# Patient Record
Sex: FEMALE | Race: WHITE | NOT HISPANIC OR LATINO | Employment: FULL TIME | URBAN - METROPOLITAN AREA
[De-identification: names, ages, dates, MRNs, and addresses within clinical notes are randomized per-mention and may not be internally consistent; named-entity substitution may affect disease eponyms.]

---

## 2017-05-30 ENCOUNTER — AMBULATORY - RIVER FALLS (OUTPATIENT)
Dept: FAMILY MEDICINE | Facility: CLINIC | Age: 27
End: 2017-05-30

## 2023-02-06 ENCOUNTER — HOSPITAL ENCOUNTER (EMERGENCY)
Facility: CLINIC | Age: 33
Discharge: HOME | End: 2023-02-07
Attending: EMERGENCY MEDICINE | Admitting: EMERGENCY MEDICINE
Payer: MEDICAID

## 2023-02-06 ENCOUNTER — APPOINTMENT (OUTPATIENT)
Dept: ULTRASOUND IMAGING | Facility: CLINIC | Age: 33
End: 2023-02-06
Attending: EMERGENCY MEDICINE
Payer: MEDICAID

## 2023-02-06 DIAGNOSIS — N93.9 VAGINAL BLEEDING: ICD-10-CM

## 2023-02-06 DIAGNOSIS — D62 ANEMIA DUE TO BLOOD LOSS, ACUTE: ICD-10-CM

## 2023-02-06 DIAGNOSIS — Z32.01 PREGNANCY TEST POSITIVE: ICD-10-CM

## 2023-02-06 LAB
ABO/RH(D): NORMAL
ANTIBODY SCREEN: NEGATIVE
BASOPHILS # BLD AUTO: 0.1 10E3/UL (ref 0–0.2)
BASOPHILS NFR BLD AUTO: 1 %
EOSINOPHIL # BLD AUTO: 0.1 10E3/UL (ref 0–0.7)
EOSINOPHIL NFR BLD AUTO: 1 %
ERYTHROCYTE [DISTWIDTH] IN BLOOD BY AUTOMATED COUNT: 13 % (ref 10–15)
HCG INTACT+B SERPL-ACNC: 21 MIU/ML
HCT VFR BLD AUTO: 27.2 % (ref 35–47)
HGB BLD-MCNC: 9 G/DL (ref 11.7–15.7)
HOLD SPECIMEN: NORMAL
HOLD SPECIMEN: NORMAL
IMM GRANULOCYTES # BLD: 0.1 10E3/UL
IMM GRANULOCYTES NFR BLD: 1 %
LYMPHOCYTES # BLD AUTO: 1.8 10E3/UL (ref 0.8–5.3)
LYMPHOCYTES NFR BLD AUTO: 20 %
MCH RBC QN AUTO: 29.9 PG (ref 26.5–33)
MCHC RBC AUTO-ENTMCNC: 33.1 G/DL (ref 31.5–36.5)
MCV RBC AUTO: 90 FL (ref 78–100)
MONOCYTES # BLD AUTO: 0.7 10E3/UL (ref 0–1.3)
MONOCYTES NFR BLD AUTO: 7 %
NEUTROPHILS # BLD AUTO: 6.3 10E3/UL (ref 1.6–8.3)
NEUTROPHILS NFR BLD AUTO: 70 %
NRBC # BLD AUTO: 0 10E3/UL
NRBC BLD AUTO-RTO: 0 /100
PLATELET # BLD AUTO: 433 10E3/UL (ref 150–450)
RBC # BLD AUTO: 3.01 10E6/UL (ref 3.8–5.2)
SPECIMEN EXPIRATION DATE: NORMAL
WBC # BLD AUTO: 9 10E3/UL (ref 4–11)

## 2023-02-06 PROCEDURE — 86901 BLOOD TYPING SEROLOGIC RH(D): CPT | Performed by: EMERGENCY MEDICINE

## 2023-02-06 PROCEDURE — 36415 COLL VENOUS BLD VENIPUNCTURE: CPT | Performed by: EMERGENCY MEDICINE

## 2023-02-06 PROCEDURE — 85025 COMPLETE CBC W/AUTO DIFF WBC: CPT | Performed by: EMERGENCY MEDICINE

## 2023-02-06 PROCEDURE — 86850 RBC ANTIBODY SCREEN: CPT | Performed by: EMERGENCY MEDICINE

## 2023-02-06 PROCEDURE — 99285 EMERGENCY DEPT VISIT HI MDM: CPT | Mod: 25

## 2023-02-06 PROCEDURE — 76801 OB US < 14 WKS SINGLE FETUS: CPT

## 2023-02-06 PROCEDURE — 84702 CHORIONIC GONADOTROPIN TEST: CPT | Performed by: EMERGENCY MEDICINE

## 2023-02-06 RX ORDER — PRENATAL VIT/IRON FUM/FOLIC AC 27MG-0.8MG
1 TABLET ORAL DAILY
Qty: 90 TABLET | Refills: 3 | Status: SHIPPED | OUTPATIENT
Start: 2023-02-06

## 2023-02-06 ASSESSMENT — ENCOUNTER SYMPTOMS
COUGH: 0
RHINORRHEA: 0
VOMITING: 0
LIGHT-HEADEDNESS: 1
SORE THROAT: 0
MYALGIAS: 1
FREQUENCY: 0
DYSURIA: 0
DIARRHEA: 0
DIFFICULTY URINATING: 0
FEVER: 1
HEMATURIA: 0

## 2023-02-06 ASSESSMENT — ACTIVITIES OF DAILY LIVING (ADL)
ADLS_ACUITY_SCORE: 35
ADLS_ACUITY_SCORE: 35

## 2023-02-06 NOTE — Clinical Note
Flor Urias was seen and treated in our emergency department on 2/6/2023.  She may return to work on 02/08/2023.       If you have any questions or concerns, please don't hesitate to call.      Devante Romano MD

## 2023-02-07 VITALS
RESPIRATION RATE: 18 BRPM | HEART RATE: 91 BPM | SYSTOLIC BLOOD PRESSURE: 113 MMHG | WEIGHT: 120 LBS | BODY MASS INDEX: 23.44 KG/M2 | OXYGEN SATURATION: 100 % | TEMPERATURE: 98.2 F | DIASTOLIC BLOOD PRESSURE: 78 MMHG

## 2023-02-07 NOTE — ED TRIAGE NOTES
[facility] bleeding with large clots and cramping x3 weeks. Hgb today was 9.4. Faintly positive UPT at clinic. Pt feeling lightheaded and dizzy x1 week. Pt states she was going through tampons every 10 minutes intermittently, today not bleeding very much. ABCs intact.

## 2023-02-07 NOTE — ED PROVIDER NOTES
History     Chief Complaint:  Vaginal Bleeding       The history is provided by the patient.      Flor Urias is an otherwise healthy 32 year old female, , who presents with heavy vaginal bleeding since 3 weeks ago. At worst, she would usually go through a tampon every 10 minutes. She has noticed some clots with the bleeding. While passing the clots, she would have some lightheadedness and cramping. Another symptom mentioned was a fever for 1 day last week, and it was as high as 105 degrees, which has since resolved. Her bleeding has lightened almost to a stop over the past couple of days, and she has not noticed anymore clots. [facility] where she was noted to have a positive hCG urine and a hemoglobin of 9.6. She was then advised to go to the ED. She previously had her Nexplanon taken out in 2022, and has had only a couple light periods since then. Her last one was 3 months ago. She does not have a history of clotting or bleeding disorder. She denies cough, rhinorrhea, sore throat, vomiting, diarrhea, or urinary symptoms.     Independent Historian: None     Review of External Notes: None      ROS:  Review of Systems   Constitutional: Positive for fever.   HENT: Negative for rhinorrhea and sore throat.    Respiratory: Negative for cough.    Gastrointestinal: Negative for diarrhea and vomiting.   Genitourinary: Positive for vaginal bleeding (w/ clots). Negative for decreased urine volume, difficulty urinating, dysuria, frequency, hematuria and urgency.   Musculoskeletal: Positive for myalgias.   Neurological: Positive for light-headedness.   All other systems reviewed and are negative.    Allergies:  No Known Allergies     Medications:    The patient denies current use of medications.     Past Medical History:    The patient denies pertinent past medical history.    Past Surgical History:    [place] teeth extraction     Social History:  Hx of smoking   PCP:  No Ref-Primary, Physician   The patient presents to the ED alone via private vehicle     Physical Exam     Patient Vitals for the past 24 hrs:   BP Temp Temp src Pulse Resp SpO2 Weight   02/06/23 1841 113/78 98.2  F (36.8  C) Temporal 91 18 100 % 54.4 kg (120 lb)        Physical Exam  Vitals and nursing note reviewed.   Constitutional:       General: She is not in acute distress.     Appearance: She is not ill-appearing.   HENT:      Head: Normocephalic and atraumatic.      Right Ear: External ear normal.      Left Ear: External ear normal.   Eyes:      Extraocular Movements: Extraocular movements intact.      Conjunctiva/sclera: Conjunctivae normal.   Cardiovascular:      Rate and Rhythm: Normal rate and regular rhythm.      Heart sounds: No murmur heard.  Pulmonary:      Effort: Pulmonary effort is normal. No respiratory distress.      Breath sounds: Normal breath sounds. No wheezing, rhonchi or rales.   Abdominal:      General: Abdomen is flat. Bowel sounds are normal. There is no distension.      Palpations: Abdomen is soft.      Tenderness: There is no abdominal tenderness. There is no guarding or rebound.   Musculoskeletal:         General: No deformity or signs of injury.      Cervical back: Normal range of motion and neck supple.   Skin:     General: Skin is warm and dry.      Coloration: Skin is not pale.      Findings: No rash.   Neurological:      Mental Status: She is alert and oriented to person, place, and time.   Psychiatric:         Behavior: Behavior normal.       Emergency Department Course   Imaging:  OB  US 1st trimester w transvag  Final Result  IMPRESSION:   There is no evidence of pregnancy. Ectopic pregnancy is not ruled out.    Report per radiology    Laboratory:  Labs Ordered and Resulted from Time of ED Arrival to Time of ED Departure   HCG QUANTITATIVE PREGNANCY - Abnormal       Result Value    hCG Quantitative 21 (*)    CBC WITH PLATELETS AND DIFFERENTIAL - Abnormal    WBC Count 9.0       RBC Count 3.01 (*)     Hemoglobin 9.0 (*)     Hematocrit 27.2 (*)     MCV 90      MCH 29.9      MCHC 33.1      RDW 13.0      Platelet Count 433      % Neutrophils 70      % Lymphocytes 20      % Monocytes 7      % Eosinophils 1      % Basophils 1      % Immature Granulocytes 1      NRBCs per 100 WBC 0      Absolute Neutrophils 6.3      Absolute Lymphocytes 1.8      Absolute Monocytes 0.7      Absolute Eosinophils 0.1      Absolute Basophils 0.1      Absolute Immature Granulocytes 0.1      Absolute NRBCs 0.0     TYPE AND SCREEN, ADULT    ABO/RH(D) O POS      Antibody Screen Negative      SPECIMEN EXPIRATION DATE [id number]     ABO/RH TYPE AND SCREEN      Emergency Department Course & Assessments:    Interventions:  None     Independent Interpretation (X-rays, CTs, rhythm strip):  None     Consultations/Discussion of Management or Tests:  None     Social Determinants of Health affecting care:  None     Assessments:   I obtained history and examined the patient as noted above.   I rechecked the patient and explained findings.    Disposition:  The patient was discharged to home.     Impression & Plan    Medical Decision Makin-year-old female with 3 weeks of vaginal bleeding.  Her bleeding has actually significantly improved in the last 2 days and now she says that she basically has no bleeding actively.  Her abdominal exam is also benign she denies any pain currently.  She did apparently have anemia on [facility] at [facility] and a slightly positive pregnancy.  Suspect that she may have miscarried.  Less likely ectopic pregnancy given the duration of her symptoms.  We will check labs here and a pelvic ultrasound to evaluate for any signs of an IUP or retained products.     patient's hCG is only 21.  Suspect that she miscarried.  Her ultrasound does not show any obvious retained products and no signs of an IUP.  It is unlikely that this represents an early pregnancy or an ectopic at this point.   Given that her bleeding has essentially stopped at this point and she is hemodynamically stable, we will plan to discharge and have her follow-up with GYN this week and have a repeat hCG at that time.  We did discuss return precautions for increased bleeding or pain.  Patient was Rh+ so no need for RhoGAM at this time.    Diagnosis:    ICD-10-CM    1. Vaginal bleeding  N93.9 hCG Quantitative Pregnancy      2. Pregnancy test positive  Z32.01 hCG Quantitative Pregnancy      3. Anemia due to blood loss, acute  D62            Discharge Medications:  New Prescriptions    PRENATAL VIT-FE FUMARATE-FA (PRENATAL MULTIVITAMIN W/IRON) 27-0.8 MG TABLET    Take 1 tablet by mouth daily          Scribe Disclosure:  I, [place], am serving as a scribe at 8:54 PM on 2/6/2023 to document services personally performed by Devante Romano MD based on my observations and the provider's statements to me.    2/6/2023   Devante Romano MD Goodwin, Shaun M, MD  02/07/23 0005

## 2023-02-07 NOTE — ED NOTES
PIT/Triage Evaluation    Patient presented with 3 weeks of vaginal bleeding no significant associated pelvic or abdominal pain.  Positive hCG urine at [facility].  Hemoglobin now is 9.6.  Sent over for evaluation.  1 previous pregnancy at age 18 ended in spontaneous miscarriage.    Had Implanon removed in September.    Exam is notable for:  Well-appearing  No significant pallor  Benign abdomen    Appropriate interventions for symptom management were initiated if applicable.  Appropriate diagnostic tests were initiated if indicated.    Important information for subsequent clinician:  For trimester OB work-up    I briefly evaluated the patient and developed an initial plan of care. I discussed this plan and explained that this brief interaction does not constitute a full evaluation. Patient/family understands that they should wait to be fully evaluated and discuss any test results with another clinician prior to leaving the hospital.       Luis Miguel Bello MD  02/06/23 1957